# Patient Record
Sex: FEMALE | Race: OTHER | Employment: OTHER | URBAN - METROPOLITAN AREA
[De-identification: names, ages, dates, MRNs, and addresses within clinical notes are randomized per-mention and may not be internally consistent; named-entity substitution may affect disease eponyms.]

---

## 2018-05-02 ENCOUNTER — TELEPHONE (OUTPATIENT)
Dept: VASCULAR SURGERY | Age: 68
End: 2018-05-02

## 2018-05-02 DIAGNOSIS — M79.604 PAIN IN BOTH LOWER EXTREMITIES: Primary | ICD-10-CM

## 2018-05-02 DIAGNOSIS — M79.605 PAIN IN BOTH LOWER EXTREMITIES: Primary | ICD-10-CM

## 2018-05-02 NOTE — TELEPHONE ENCOUNTER
Patient called she has been having pain and swelling in both leg but mostly right. She wanted to have study done. Told her I would send message to see if she needs appointment 1st, someone would call her back.

## 2018-07-11 ENCOUNTER — OFFICE VISIT (OUTPATIENT)
Dept: VASCULAR SURGERY | Age: 68
End: 2018-07-11

## 2018-07-11 VITALS
RESPIRATION RATE: 17 BRPM | DIASTOLIC BLOOD PRESSURE: 62 MMHG | HEIGHT: 62 IN | BODY MASS INDEX: 36.8 KG/M2 | WEIGHT: 200 LBS | SYSTOLIC BLOOD PRESSURE: 140 MMHG | HEART RATE: 80 BPM

## 2018-07-11 DIAGNOSIS — I70.213 ATHEROSCLEROSIS OF NATIVE ARTERY OF BOTH LOWER EXTREMITIES WITH INTERMITTENT CLAUDICATION (HCC): Primary | ICD-10-CM

## 2018-07-11 DIAGNOSIS — I70.203 BILATERAL FEMORAL ARTERY STENOSIS (HCC): ICD-10-CM

## 2018-07-11 RX ORDER — AMLODIPINE BESYLATE 10 MG/1
TABLET ORAL DAILY
COMMUNITY

## 2018-07-11 RX ORDER — GABAPENTIN 300 MG/1
300 CAPSULE ORAL 3 TIMES DAILY
COMMUNITY

## 2018-07-11 NOTE — MR AVS SNAPSHOT
[address] [facility] [address] 
[phone] Patient: Emily Ruelas MRN: [id number] VEB:[id number] Visit Information Date & Time Provider Department Dept. Phone Encounter #  
 7/11/2018  2:00 PM Jorge Luis Lemos, [address] [facility] [id number] Your Appointments 7/30/2018  2:45 PM  
PROCEDURE with [facility] NONIMAGING [facility] ([address] [place] [address]) Appt Note: ADAIR Melissa Brill [address] [facility] [address] 697 200 [facility]  
[phone] 26[address] [facility],[address]  
  
    
 7/30/2018  3:45 PM  
PROCEDURE with [facility] IMAGING 2 [facility] ([address] [place] [address]) Appt Note: Haile LE with Simona Hernandez [address] [facility] [address] 998 200 [facility]  
[phone] Fela Damon Narciso 172 47 [facility]  
  
    
 8/16/2018  1:45 PM  
Follow Up with JOSE [facility] ([address] [place] [address]) Appt Note: Fup after studies [address] [facility] [address] 580 200 [facility]  
[phone] 2300 [facility] 200 [facility] Upcoming Health Maintenance Date Due Hepatitis C Screening 1950 DTaP/Tdap/Td series (1 - Tdap) 5/24/1970 BREAST CANCER SCRN MAMMOGRAM 5/24/1999 FOBT Q 1 YEAR AGE 50-75 5/24/1999 ZOSTER VACCINE AGE 60> 3/24/2009 GLAUCOMA SCREENING Q2Y 5/24/2014 Bone Densitometry (Dexa) Screening 5/24/2014 Pneumococcal 65+ Low/Medium Risk (1 of 2 - PCV13) 5/24/2014 MEDICARE YEARLY EXAM 3/14/2018 Influenza Age 5 to Adult 8/1/2018 Allergies as of 7/11/2018  Review Complete On: 5/12/2016 By: Maria Guadalupe Jefferson RN Severity Noted Reaction Type Reactions Sulfa (Sulfonamide Antibiotics) Medium 05/12/2016   Systemic Shortness of Breath, Rash Diflucan [Fluconazole]    Unknown (comments) Flagyl [Metronidazole]    Unknown (comments) Iodine    Unknown (comments) Reglan [Metoclopramide]    Unknown (comments) Shellfish Derived    Unknown (comments) Current Immunizations  Never Reviewed No immunizations on file. Not reviewed this visit You Were Diagnosed With   
  
 Codes Comments Atherosclerosis of native artery of both lower extremities with intermittent claudication ([facility]    -  Primary ICD-10-CM: N48.039 ICD-9-CM: 440.21 Vitals BP Pulse Resp Height(growth percentile) Weight(growth percentile) BMI  
 140/62 (BP 1 Location: Left arm, BP Patient Position: Sitting) 80 17 5' 2\" (1.575 m) 200 lb (90.7 kg) 36.58 kg/m2 OB Status Smoking Status Hysterectomy Never Smoker Vitals History BMI and BSA Data Body Mass Index Body Surface Area  
 36.58 kg/m 2 1.99 m 2 Your Updated Medication List  
  
   
This list is accurate as of 7/11/18  2:26 PM.  Always use your most recent med list.  
  
  
  
  
 coenzyme q10-vitamin e 100-100 mg-unit Cap Take 200 mg by mouth daily. cyanocobalamin 1,000 mcg tablet Take 1,000 mcg by mouth daily. Dexlansoprazole 60 mg Cpdb Take 60 mg by mouth daily. escitalopram oxalate 10 mg tablet Commonly known as:  Celesta Murders Take 10 mg by mouth daily. gabapentin 300 mg capsule Commonly known as:  NEURONTIN Take 300 mg by mouth three (3) times daily. HUMALOG SC  
by SubCUTAneous route. hydroCHLOROthiazide 25 mg tablet Commonly known as:  HYDRODIURIL Take 25 mg by mouth daily. LANTUS U-100 INSULIN 100 unit/mL injection Generic drug:  insulin glargine  
by SubCUTAneous route nightly. losartan 100 mg tablet Commonly known as:  COZAAR Take 100 mg by mouth daily. metoprolol succinate 100 mg tablet Commonly known as:  TOPROL-XL Take  by mouth daily. NORVASC 10 mg tablet Generic drug:  amLODIPine Take  by mouth daily. pantoprazole 40 mg tablet Commonly known as:  PROTONIX Take 40 mg by mouth daily. PLAVIX 75 mg Tab Generic drug:  clopidogrel Take  by mouth daily. rosuvastatin 20 mg tablet Commonly known as:  CRESTOR Take 20 mg by mouth nightly. VITAMIN D2 50,000 unit capsule Generic drug:  ergocalciferol Take 50,000 Units by mouth. ZyrTEC 10 mg Cap Generic drug:  Cetirizine Take  by mouth. To-Do List   
 07/11/2018 Vascular/US:  DUPLEX LOW EXT ARTERIES WITH ADAIR Introducing [facility]! [place] [employer] introduces [employer] patient portal. Now you can access parts of your medical record, email your doctor's office, and request medication refills online. 1. In your internet browser, go to [email or web address] 2. Click on the First Time User? Click Here link in the Sign In box. You will see the New Member Sign Up page. 3. Enter your [employer] Access Code exactly as it appears below. You will not need to use this code after youve completed the sign-up process. If you do not sign up before the expiration date, you must request a new code. · [employer] Access Code: [email or web address] Expires: 7/31/2018  1:39 PM 
 
4. Enter the last four digits of your Social Security Number (xxxx) and Date of Birth (mm/dd/yyyy) as indicated and click Submit. You will be taken to the next sign-up page. 5. Create a [employer] ID. This will be your [employer] login ID and cannot be changed, so think of one that is secure and easy to remember. 6. Create a [employer] password. You can change your password at any time. 7. Enter your Password Reset Question and Answer. This can be used at a later time if you forget your password. 8. Enter your e-mail address. You will receive e-mail notification when new information is available in [address] 9. Click Sign Up.  You can now view and download portions of your medical record. 10. Click the Download Summary menu link to download a portable copy of your medical information. If you have questions, please visit the Frequently Asked Questions section of the [employer] website. Remember, [employer] is NOT to be used for urgent needs. For medical emergencies, dial 911. Now available from your iPhone and Android! Please provide this summary of care documentation to your next provider. Your primary care clinician is listed as Cornelious Felty. If you have any questions after today's visit, please call [phone].

## 2018-07-11 NOTE — PROGRESS NOTES
Talia Jones    Chief Complaint   Patient presents with    Leg Pain       History and Physical    Ms Maddy Elizondo is here with complaints of leg pain, consistent with claudication. It is been about 2 years since she was last in. At that time it was for the same symptoms, primarily involving the left leg. But now she says symptoms are bilateral.  What we had seen previously was that she did have severe PAD of the left leg, with an ADAIR of about 0.5. Right leg only had mild to moderate disease, with ADAIR of about 0.8. We discussed at that time about proceeding with angiogram, but she was also pending thyroid surgery, and wanted to delay any scheduling. She had stated that she would call us back. She did not do so for 2 years, and in fact she never had a thyroid surgery either. She seems very hesitant about committing to treatment plans. But she is at a point where her symptoms are quite debilitating. She uses the example of her hallway, for example, and states that she had to stop several times walking in the [place] because of the pain. She has not progressed to the point of rest pain though. She does remain on good medical therapy, including Plavix. Past Medical History:   Diagnosis Date    Allergic rhinitis     Anxiety     Constipation     Depression     Diabetes (HCC)     GERD (gastroesophageal reflux disease)     High cholesterol     Hyperlipidemia     Hypertension     Osteopenia     Other cataract     Pain in limb     Pelvic pain     Plantar fasciitis     Proteinuria     Thyroid nodule     Vitamin B12 deficiency     Vitamin D deficiency      There is no problem list on file for this patient. Past Surgical History:   Procedure Laterality Date    HX  SECTION      HX CHOLECYSTECTOMY      HX HYSTERECTOMY       Current Outpatient Prescriptions   Medication Sig Dispense Refill    Cetirizine (ZYRTEC) 10 mg cap Take  by mouth.       amLODIPine (NORVASC) 10 mg tablet Take  by mouth daily.  gabapentin (NEURONTIN) 300 mg capsule Take 300 mg by mouth three (3) times daily.  Dexlansoprazole 60 mg CpDB Take 60 mg by mouth daily.  cyanocobalamin 1,000 mcg tablet Take 1,000 mcg by mouth daily.  coenzyme q10-vitamin e 100-100 mg-unit cap Take 200 mg by mouth daily.  rosuvastatin (CRESTOR) 20 mg tablet Take 20 mg by mouth nightly.  metoprolol succinate (TOPROL-XL) 100 mg tablet Take  by mouth daily.  escitalopram oxalate (LEXAPRO) 10 mg tablet Take 10 mg by mouth daily.  clopidogrel (PLAVIX) 75 mg tablet Take  by mouth daily.  losartan (COZAAR) 100 mg tablet Take 100 mg by mouth daily.  insulin glargine (LANTUS) 100 unit/mL injection by SubCUTAneous route nightly.  INSULIN LISPRO (HUMALOG SC) by SubCUTAneous route.  ergocalciferol (VITAMIN D2) 50,000 unit capsule Take 50,000 Units by mouth.  hydrochlorothiazide (HYDRODIURIL) 25 mg tablet Take 25 mg by mouth daily.  pantoprazole (PROTONIX) 40 mg tablet Take 40 mg by mouth daily.        Allergies   Allergen Reactions    Sulfa (Sulfonamide Antibiotics) Shortness of Breath and Rash    Diflucan [Fluconazole] Unknown (comments)    Flagyl [Metronidazole] Unknown (comments)    Iodine Unknown (comments)    Reglan [Metoclopramide] Unknown (comments)    Shellfish Derived Unknown (comments)       Review of Systems    Review of Systems - History obtained from the patient  General ROS: negative  Psychological ROS: positive for - anxiety  Ophthalmic ROS: negative  Respiratory ROS: negative  Cardiovascular ROS: negative  Gastrointestinal ROS: negative  Musculoskeletal ROS: negative  Neurological ROS: negative  Dermatological ROS: negative  Vascular ROS: claudication     Physical   Visit Vitals    /62 (BP 1 Location: Left arm, BP Patient Position: Sitting)    Pulse 80    Resp 17    Ht 5' 2\" (1.575 m)    Wt 200 lb (90.7 kg)    BMI 36.58 kg/m2     General:  Alert, cooperative, no distress. Head:  Normocephalic, without obvious abnormality, atraumatic. Eyes:    Conjunctivae/corneas clear. Pupils equal, round, reactive to light. Extraocular movements intact. Extremities: Extremities normal, atraumatic, no cyanosis or edema. Pulses: Distal pulses nonpalpable bilaterally but no ischemic changes   Skin: Skin color, texture, turgor normal. No rashes or lesions. Impression/Plan:     ICD-10-CM ICD-9-CM    1. Atherosclerosis of native artery of both lower extremities with intermittent claudication (HCC) I70.213 440.21 Cetirizine (ZYRTEC) 10 mg cap      amLODIPine (NORVASC) 10 mg tablet      gabapentin (NEURONTIN) 300 mg capsule      DUPLEX LOW EXT ARTERIES WITH ADAIR   2. Bilateral femoral artery stenosis (HCC) I70.203 440.20      Orders Placed This Encounter    Cetirizine (ZYRTEC) 10 mg cap    amLODIPine (NORVASC) 10 mg tablet    gabapentin (NEURONTIN) 300 mg capsule     Based on her prior studies, I discussed going ahead and planing the angiogram which had been her last discussion, but to still get new studies since it has been 2 years just to see any progression of disease, but she did not again want to commit to the angiogram.  She would first like to have the studies and then follow-up for further discussion and planning. This will be arranged according to her request    JOSE Ball    Portions of this note have been entered using voice recognition software.

## 2018-07-11 NOTE — PROGRESS NOTES
1. Have you been to an emergency room or urgent care clinic since your last visit? no    Hospitalized since your last visit? If yes, where, when, and reason for visit? no  2. Have you seen or consulted any other health care providers outside of the [facility] since your last visit including any procedures, health maintenance items.  If yes, where, when and reason for visit? no

## 2019-04-10 ENCOUNTER — OFFICE VISIT (OUTPATIENT)
Dept: VASCULAR SURGERY | Age: 69
End: 2019-04-10

## 2019-04-10 VITALS
HEIGHT: 62 IN | WEIGHT: 200 LBS | SYSTOLIC BLOOD PRESSURE: 160 MMHG | RESPIRATION RATE: 16 BRPM | BODY MASS INDEX: 36.8 KG/M2 | DIASTOLIC BLOOD PRESSURE: 58 MMHG | HEART RATE: 74 BPM

## 2019-04-10 DIAGNOSIS — K55.1 SUPERIOR MESENTERIC ARTERY STENOSIS (HCC): Primary | ICD-10-CM

## 2019-04-10 DIAGNOSIS — R10.13 POSTPRANDIAL EPIGASTRIC PAIN: ICD-10-CM

## 2019-04-10 NOTE — PROGRESS NOTES
1. Have you been to an emergency room or urgent care clinic since your last visit? NO  Hospitalized since your last visit? If yes, where, when, and reason for visit? No  2. Have you seen or consulted any other health care providers outside of the [facility] since your last visit including any procedures, health maintenance items. If yes, where, when and reason for visit?

## 2019-04-11 NOTE — PROGRESS NOTES
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 This greater than 70% stenosis of the SMA was found on duplex from an outlying facility for workup of her abdominal pain. Chart review describes that she does have some postprandial pain. It is related to the epigastric area. Upon further questioning, patient states that it is not always postprandial.  But she does have a general pain, fullness, pulling, tightening symptomatology, but no direct food aversion, no weight loss. She has had previous abdominal surgeries and she even questions if adhesions may be contributing. I stated that is certainly possible, and that in fact she may have multiple issues that are contributing to her abdominal pain, and so trying to associate mesenteric stenosis separately from that is somewhat difficult with the description of her symptoms. She had also come in last summer with complaints of leg pain, consistent with claudication. At that time it had been about 2 years since she was last in. It had been for the same symptoms, primarily involving the left leg. She now reports symptoms are bilateral.  What we had seen previously was that she did have severe PAD of the left leg, with an ADAIR of about 0.5. Right leg only had mild to moderate disease, with ADAIR of about 0.8. We discussed at that time about proceeding with angiogram, but she was also pending thyroid surgery, and wanted to delay any scheduling. She had stated that she would call us back. She did not do so for 2 years, and in fact she never had a thyroid surgery either. She seems very hesitant about committing to treatment plans. When she was here in July though, we discussed and we were going to go ahead and proceed with scheduling an angiogram.  Again, we see that that did not take place. She states that because she was hospitalized with CHF.   She is also now on full-time oxygen therapy, but is unclear as to the reason why. She has a pulmonologist has cleared her from any pulmonary issues. She wonders if it is vascular related, which I said that there would generally not be any association    Even though this referral was specific to the GI request, she is more focused and concerned about her leg pain. She is at a point where her symptoms are quite debilitating. She uses the example of her hallway, for example, and states that she had to stop several times walking [place] because of the pain. She has not progressed to the point of rest pain though. She does remain on good medical therapy, including Plavix. She did have some repeated arterial studies prior to today's visit, I am not sure when those were ordered but they were completed and summarized as follows:  Right Lower Arterial     Patent distal right external iliac artery, common femoral artery, proximal femoral artery, femoral artery and popliteal artery without significant stenosis. Triphasic to biphasic signals noted. Diffuse calcific plaquing noted in the mild (<50%) stenosis. Right tibial arteries patent without significant stenosis. Poorly biphasic signals noted throughout. Left Lower Arterial     Patent distal left external iliac artery, common femoral artery and proximal profunda femoris artery without significant stenosis. Biphasic to poorly biphasic signals noted. Diffuse calcific plaquing noted. Left femoral artery patent with elevated velocities noted in the distal thigh level suggesting moderate to severe (50-75%) stenosis due to narrowing. Diffuse calcific plaquing noted. Poorly biphasic signals noted. Left tibial artery patent without significant stenosis in the segments accessible to scan. Monophasic signals noted. Diffuse calcific plaquing noted throughout. ADAIR     Bilateral ABIs are unreliable due to medial calcinosis. The right anterior tibial artery and posterior tibial artery has monophasic waveforms.  Right toe PPG is dampened. The left anterior tibial artery and posterior tibial artery has monophasic waveforms. Left toe PPG is dampened. Moderate arterial insufficiency bilaterally at rest by waveform analysis, left greater than right. Arterial Pressure Measurements      Right Left   Brachial  mmHg       141 mmHg         Post Tibial  mmHg       94 mmHg         Tibial/DP  mmHg       82 mmHg         ADAIR 1.01        0.61          Toe Pressure 62 mmHg       47 mmHg         TBI 0.4        0.3                    Prior Study     The exam was compared to the study performed on 5/27/2016. Essentially no significant changes. I reviewed that there is really no change his study results for about 3 years, but she states that her symptoms are just getting progressively worse. Her pain is very generalized throughout the whole legs, and not specific to calf claudication. So I again reviewed that her symptoms may be multifactorial, such as some musculoskeletal component to. And, symptoms are bilateral, with right leg being with normal perfusion. But she does confess the left leg is the worst.  I told her I would not set expectations too high that being able to do any treatment will completely relieve her symptoms if in fact there are other conditions causing her pain. But she seems very focused on the issue with the legs, and would actually like to prioritize having the legs treated before any considerable treatment for the SMA disease, because she would also like to seek out general surgery opinion regarding the adhesions as a contributor. I am going to do say that it was difficult to maintain a focused discussion about the relevant vascular issues today. She is trying to associate other causes of her pain, which may be legitimate, but I explained the vascular emphasis. Again, I cannot guarantee that any intervention will offer absolutely beneficial outcomes.   We certainly have findings that support disease that could be amenable to treatment and that she may be able to gain some improvement in her symptoms. With her leg being the actual focus, we can start with that. Typically our diagnostic imaging at the time of any potential endovascular intervention would include aortogram with runoff. It is possible that we may be able to include diagnostic of the SMA to confirm the ultrasound findings. This could be done at the time of treatment for her left leg, and if we see a treatable SMA lesion we could certainly staged procedure we could come back and do that. She states that she has some other things on her schedule and would not want to schedule anything until after April 18. So we will call her back for opportunity to choose a date that is best suited for her. She can remain on her Plavix and aspirin therapy. She will need some repeat labs including kidney function.   It appears she will also have to be premedicated due to contrast allergy so we will coordinate that through her pharmacy    At least 25-30 minutes was spent in discussion with this patient today

## 2019-04-25 ENCOUNTER — DOCUMENTATION ONLY (OUTPATIENT)
Dept: VASCULAR SURGERY | Age: 69
End: 2019-04-25

## 2019-04-25 NOTE — PROGRESS NOTES
Patient returned call and states she has other health problems going on at this time.  She would liked to wait until they have been taken care of before she has this surgery

## 2019-08-06 RX ORDER — RANITIDINE 150 MG/1
TABLET, FILM COATED ORAL
Qty: 2 TAB | Refills: 0 | Status: SHIPPED | OUTPATIENT
Start: 2019-08-06

## 2019-08-06 RX ORDER — PREDNISONE 50 MG/1
TABLET ORAL
Qty: 3 TAB | Refills: 0 | Status: SHIPPED | OUTPATIENT
Start: 2019-08-06

## 2019-08-06 RX ORDER — DIPHENHYDRAMINE HCL 25 MG
CAPSULE ORAL
Qty: 4 CAP | Refills: 0 | Status: SHIPPED | OUTPATIENT
Start: 2019-08-06

## 2019-09-05 ENCOUNTER — DOCUMENTATION ONLY (OUTPATIENT)
Dept: VASCULAR SURGERY | Age: 69
End: 2019-09-05

## 2019-09-05 NOTE — PROGRESS NOTES
PCP states that she is not cleared for sugars are to high at this time. Surgery canceled at this time.

## 2020-03-10 ENCOUNTER — TELEPHONE (OUTPATIENT)
Dept: VASCULAR SURGERY | Age: 70
End: 2020-03-10

## 2020-03-10 NOTE — TELEPHONE ENCOUNTER
Called and left detailed message to call back regarding procedure, richard she wanted to see MD first or just schedule leg angio vs mesenteric angio.

## 2020-03-10 NOTE — TELEPHONE ENCOUNTER
----- Message from JOSE Hernandez sent at 3/10/2020  8:48 AM EDT -----  What procedure does she want? Leg angio vs mesenteric? Whatever she decides just schedule. If she prefers to come in though do it with victoriano.   ----- Message -----  From: Stephen Vora RN  Sent: 3/10/2020   7:31 AM EDT  To: JOSE Hernandez    Patient called and stated she is ready for procedure? Do you want to see or just schedule?

## 2020-03-10 NOTE — TELEPHONE ENCOUNTER
Patient called back and she wants to just go ahead and schedule procedure (mesentaric angio)so she can get it done. Pt already has the dye prep from when she was scheduled prior. Gave to liz to schedule.

## 2020-03-10 NOTE — TELEPHONE ENCOUNTER
------ Message -----  From: Mercy Vivas RN  Sent: 3/10/2020   7:31 AM EDT  To: JOSE Ball    Patient called and stated she is ready for procedure? Do you want to see or just schedule?

## 2020-03-18 DIAGNOSIS — I70.203 BILATERAL FEMORAL ARTERY STENOSIS (HCC): Primary | ICD-10-CM

## 2020-03-18 RX ORDER — PANTOPRAZOLE SODIUM 40 MG/1
TABLET, DELAYED RELEASE ORAL
Qty: 2 TAB | Refills: 0 | Status: SHIPPED | OUTPATIENT
Start: 2020-03-18

## 2020-03-18 NOTE — TELEPHONE ENCOUNTER
Order for protonix 40 mg Take one tab by mouth 7hrs and one hr prior to procedure #3/0 placed per verbal order from Dr. Adam Perdomo.

## 2020-06-02 NOTE — H&P
Surgery History and Physical    Subjective:      Sean Ashton is a 69 y.o.  female who presents with known mesenteric artery stenosis found on prior duplex imaging. She had delayed further reevaluation/treatment due to other issues and she called recently to try to get the mesenteric angio/intervention arranged that we had discussed with her previously    There are no active problems to display for this patient. Past Medical History:   Diagnosis Date    Allergic rhinitis     Anxiety     Constipation     Depression     Diabetes (HCC)     GERD (gastroesophageal reflux disease)     High cholesterol     Hyperlipidemia     Hypertension     Osteopenia     Other cataract     Pain in limb     Pelvic pain     Plantar fasciitis     Proteinuria     Thyroid nodule     Vitamin B12 deficiency     Vitamin D deficiency       Past Surgical History:   Procedure Laterality Date    HX  SECTION      HX CHOLECYSTECTOMY      HX HYSTERECTOMY        Social History     Tobacco Use    Smoking status: Never Smoker   Substance Use Topics    Alcohol use: No      Family History   Problem Relation Age of Onset    Diabetes Other     Heart Disease Other     Hypertension Mother     Heart Disease Father     Diabetes Sister     Stroke Brother       Prior to Admission medications    Medication Sig Start Date End Date Taking? Authorizing Provider   pantoprazole (PROTONIX) 40 mg tablet Take one tab by mouth 7hrs and one hr prior to procedure. 3/18/20   Sarah Rebollar MD   predniSONE (DELTASONE) 50 mg tablet One tab by mouth 13hrs, 7hrs and One hr prior to procedure. 19   JOSE Mcallister   diphenhydrAMINE (BENADRYL) 25 mg capsule Take two tab by mouth 7hrs and one hr prior to procedure. 19   JOSE Mcallister   raNITIdine (ZANTAC) 150 mg tablet Take one tab by mouth 7hrs and one hr prior to procedure. 19   JOSE Mcallister   Cetirizine (ZYRTEC) 10 mg cap Take  by mouth. Provider, Historical   amLODIPine (NORVASC) 10 mg tablet Take  by mouth daily. Provider, Historical   gabapentin (NEURONTIN) 300 mg capsule Take 300 mg by mouth three (3) times daily. Provider, Historical   Dexlansoprazole 60 mg CpDB Take 60 mg by mouth daily. Provider, Historical   cyanocobalamin 1,000 mcg tablet Take 1,000 mcg by mouth daily. Provider, Historical   coenzyme q10-vitamin e 100-100 mg-unit cap Take 200 mg by mouth daily. Provider, Historical   hydrochlorothiazide (HYDRODIURIL) 25 mg tablet Take 25 mg by mouth daily. Provider, Historical   rosuvastatin (CRESTOR) 20 mg tablet Take 20 mg by mouth nightly. Provider, Historical   metoprolol succinate (TOPROL-XL) 100 mg tablet Take  by mouth daily. Provider, Historical   escitalopram oxalate (LEXAPRO) 10 mg tablet Take 10 mg by mouth daily. Provider, Historical   clopidogrel (PLAVIX) 75 mg tablet Take  by mouth daily. Provider, Historical   losartan (COZAAR) 100 mg tablet Take 100 mg by mouth daily. Provider, Historical   insulin glargine (LANTUS) 100 unit/mL injection by SubCUTAneous route nightly. Provider, Historical   INSULIN LISPRO (HUMALOG SC) by SubCUTAneous route. Provider, Historical   ergocalciferol (VITAMIN D2) 50,000 unit capsule Take 50,000 Units by mouth. Provider, Historical   pantoprazole (PROTONIX) 40 mg tablet Take 40 mg by mouth daily. Provider, Historical     Allergies   Allergen Reactions    Sulfa (Sulfonamide Antibiotics) Shortness of Breath and Rash    Diflucan [Fluconazole] Unknown (comments)    Flagyl [Metronidazole] Unknown (comments)    Iodine Unknown (comments)    Reglan [Metoclopramide] Unknown (comments)    Shellfish Derived Unknown (comments)         Review of Systems:    Pertinent items are noted in the History of Present Illness. Objective:     No data found. No data recorded.       Physical Exam:  GENERAL: alert, cooperative, no distress, appears stated age, LUNG: clear to auscultation bilaterally, HEART: regular rate and rhythm, S1, S2 normal, no murmur, click, rub or gallop, EXTREMITIES:  extremities normal, atraumatic, no cyanosis or edema      Assessment:     Mesenteric stenosis     Plan:     Mesenteric angiogram/intervention    Signed By: JOSE Alejo     June 2, 2020

## 2020-06-03 ENCOUNTER — HOSPITAL ENCOUNTER (OUTPATIENT)
Age: 70
Discharge: HOME | End: 2020-06-03
Attending: SURGERY | Admitting: SURGERY
Payer: MEDICARE

## 2020-06-03 ENCOUNTER — ANESTHESIA EVENT (OUTPATIENT)
Dept: CARDIAC CATH/INVASIVE PROCEDURES | Age: 70
End: 2020-06-03
Payer: MEDICARE

## 2020-06-03 ENCOUNTER — ANESTHESIA (OUTPATIENT)
Dept: CARDIAC CATH/INVASIVE PROCEDURES | Age: 70
End: 2020-06-03
Payer: MEDICARE

## 2020-06-03 VITALS
RESPIRATION RATE: 18 BRPM | TEMPERATURE: 97.7 F | HEART RATE: 74 BPM | HEIGHT: 62 IN | BODY MASS INDEX: 35.51 KG/M2 | WEIGHT: 193 LBS | OXYGEN SATURATION: 100 % | SYSTOLIC BLOOD PRESSURE: 178 MMHG | DIASTOLIC BLOOD PRESSURE: 72 MMHG

## 2020-06-03 DIAGNOSIS — K55.1 SUPERIOR MESENTERIC ARTERY STENOSIS (HCC): ICD-10-CM

## 2020-06-03 LAB
BUN BLD-MCNC: 42 MG/DL (ref 7–18)
CHLORIDE BLD-SCNC: 103 MMOL/L (ref 100–108)
CREAT UR-MCNC: 1.6 MG/DL (ref 0.6–1.3)
GLUCOSE BLD STRIP.AUTO-MCNC: 360 MG/DL (ref 74–106)
HCT VFR BLD CALC: 33 % (ref 36–49)
HGB BLD-MCNC: 11.2 G/DL (ref 12–16)
POTASSIUM BLD-SCNC: 5.1 MMOL/L (ref 3.5–5.5)
SODIUM BLD-SCNC: 137 MMOL/L (ref 136–145)

## 2020-06-03 PROCEDURE — 74011000250 HC RX REV CODE- 250: Performed by: NURSE ANESTHETIST, CERTIFIED REGISTERED

## 2020-06-03 PROCEDURE — 77030008584 HC TOOL GDWRE DEV TERU -A: Performed by: SURGERY

## 2020-06-03 PROCEDURE — 76060000032 HC ANESTHESIA 0.5 TO 1 HR: Performed by: SURGERY

## 2020-06-03 PROCEDURE — C1769 GUIDE WIRE: HCPCS | Performed by: SURGERY

## 2020-06-03 PROCEDURE — C1894 INTRO/SHEATH, NON-LASER: HCPCS | Performed by: SURGERY

## 2020-06-03 PROCEDURE — 37246 TRLUML BALO ANGIOP 1ST ART: CPT | Performed by: SURGERY

## 2020-06-03 PROCEDURE — 74011250636 HC RX REV CODE- 250/636

## 2020-06-03 PROCEDURE — 36245 INS CATH ABD/L-EXT ART 1ST: CPT

## 2020-06-03 PROCEDURE — 74011250636 HC RX REV CODE- 250/636: Performed by: SURGERY

## 2020-06-03 PROCEDURE — 75726 ARTERY X-RAYS ABDOMEN: CPT | Performed by: SURGERY

## 2020-06-03 PROCEDURE — 77030013744: Performed by: SURGERY

## 2020-06-03 PROCEDURE — 74011636320 HC RX REV CODE- 636/320: Performed by: SURGERY

## 2020-06-03 PROCEDURE — 82565 ASSAY OF CREATININE: CPT

## 2020-06-03 PROCEDURE — 77030004530 HC CATH ANGI DX IMGR BSC -A: Performed by: SURGERY

## 2020-06-03 PROCEDURE — 76937 US GUIDE VASCULAR ACCESS: CPT | Performed by: SURGERY

## 2020-06-03 PROCEDURE — 74011250636 HC RX REV CODE- 250/636: Performed by: NURSE ANESTHETIST, CERTIFIED REGISTERED

## 2020-06-03 PROCEDURE — 82947 ASSAY GLUCOSE BLOOD QUANT: CPT

## 2020-06-03 PROCEDURE — 74011000250 HC RX REV CODE- 250: Performed by: SURGERY

## 2020-06-03 RX ORDER — DEXTROSE 50 % IN WATER (D50W) INTRAVENOUS SYRINGE
25-50 AS NEEDED
Status: DISCONTINUED | OUTPATIENT
Start: 2020-06-03 | End: 2020-06-04 | Stop reason: HOSPADM

## 2020-06-03 RX ORDER — IODIXANOL 320 MG/ML
INJECTION, SOLUTION INTRAVASCULAR AS NEEDED
Status: DISCONTINUED | OUTPATIENT
Start: 2020-06-03 | End: 2020-06-03 | Stop reason: HOSPADM

## 2020-06-03 RX ORDER — NALOXONE HYDROCHLORIDE 0.4 MG/ML
0.1 INJECTION, SOLUTION INTRAMUSCULAR; INTRAVENOUS; SUBCUTANEOUS AS NEEDED
Status: DISCONTINUED | OUTPATIENT
Start: 2020-06-03 | End: 2020-06-04 | Stop reason: HOSPADM

## 2020-06-03 RX ORDER — ONDANSETRON 2 MG/ML
4 INJECTION INTRAMUSCULAR; INTRAVENOUS ONCE
Status: DISCONTINUED | OUTPATIENT
Start: 2020-06-03 | End: 2020-06-03 | Stop reason: HOSPADM

## 2020-06-03 RX ORDER — SODIUM CHLORIDE, SODIUM LACTATE, POTASSIUM CHLORIDE, CALCIUM CHLORIDE 600; 310; 30; 20 MG/100ML; MG/100ML; MG/100ML; MG/100ML
50 INJECTION, SOLUTION INTRAVENOUS CONTINUOUS
Status: DISCONTINUED | OUTPATIENT
Start: 2020-06-03 | End: 2020-06-04 | Stop reason: HOSPADM

## 2020-06-03 RX ORDER — SODIUM CHLORIDE 0.9 % (FLUSH) 0.9 %
5-40 SYRINGE (ML) INJECTION AS NEEDED
Status: DISCONTINUED | OUTPATIENT
Start: 2020-06-03 | End: 2020-06-04 | Stop reason: HOSPADM

## 2020-06-03 RX ORDER — INSULIN LISPRO 100 [IU]/ML
INJECTION, SOLUTION INTRAVENOUS; SUBCUTANEOUS ONCE
Status: DISCONTINUED | OUTPATIENT
Start: 2020-06-03 | End: 2020-06-03 | Stop reason: HOSPADM

## 2020-06-03 RX ORDER — SODIUM CHLORIDE 9 MG/ML
INJECTION, SOLUTION INTRAVENOUS
Status: DISCONTINUED | OUTPATIENT
Start: 2020-06-03 | End: 2020-06-03 | Stop reason: HOSPADM

## 2020-06-03 RX ORDER — LIDOCAINE HYDROCHLORIDE 20 MG/ML
INJECTION, SOLUTION EPIDURAL; INFILTRATION; INTRACAUDAL; PERINEURAL AS NEEDED
Status: DISCONTINUED | OUTPATIENT
Start: 2020-06-03 | End: 2020-06-03 | Stop reason: HOSPADM

## 2020-06-03 RX ORDER — HYDRALAZINE HYDROCHLORIDE 20 MG/ML
INJECTION INTRAMUSCULAR; INTRAVENOUS
Status: COMPLETED
Start: 2020-06-03 | End: 2020-06-03

## 2020-06-03 RX ORDER — LIDOCAINE HYDROCHLORIDE 10 MG/ML
INJECTION, SOLUTION EPIDURAL; INFILTRATION; INTRACAUDAL; PERINEURAL AS NEEDED
Status: DISCONTINUED | OUTPATIENT
Start: 2020-06-03 | End: 2020-06-03 | Stop reason: HOSPADM

## 2020-06-03 RX ORDER — DIPHENHYDRAMINE HYDROCHLORIDE 50 MG/ML
12.5 INJECTION, SOLUTION INTRAMUSCULAR; INTRAVENOUS
Status: DISCONTINUED | OUTPATIENT
Start: 2020-06-03 | End: 2020-06-04 | Stop reason: HOSPADM

## 2020-06-03 RX ORDER — PROPOFOL 10 MG/ML
VIAL (ML) INTRAVENOUS
Status: DISCONTINUED | OUTPATIENT
Start: 2020-06-03 | End: 2020-06-03 | Stop reason: HOSPADM

## 2020-06-03 RX ORDER — HYDRALAZINE HYDROCHLORIDE 20 MG/ML
10 INJECTION INTRAMUSCULAR; INTRAVENOUS ONCE
Status: COMPLETED | OUTPATIENT
Start: 2020-06-03 | End: 2020-06-03

## 2020-06-03 RX ORDER — SODIUM CHLORIDE 0.9 % (FLUSH) 0.9 %
5-40 SYRINGE (ML) INJECTION EVERY 8 HOURS
Status: DISCONTINUED | OUTPATIENT
Start: 2020-06-03 | End: 2020-06-04 | Stop reason: HOSPADM

## 2020-06-03 RX ORDER — MAGNESIUM SULFATE 100 %
4 CRYSTALS MISCELLANEOUS AS NEEDED
Status: DISCONTINUED | OUTPATIENT
Start: 2020-06-03 | End: 2020-06-04 | Stop reason: HOSPADM

## 2020-06-03 RX ORDER — FENTANYL CITRATE 50 UG/ML
50 INJECTION, SOLUTION INTRAMUSCULAR; INTRAVENOUS AS NEEDED
Status: DISCONTINUED | OUTPATIENT
Start: 2020-06-03 | End: 2020-06-04 | Stop reason: HOSPADM

## 2020-06-03 RX ORDER — LABETALOL HCL 20 MG/4 ML
SYRINGE (ML) INTRAVENOUS AS NEEDED
Status: DISCONTINUED | OUTPATIENT
Start: 2020-06-03 | End: 2020-06-03 | Stop reason: HOSPADM

## 2020-06-03 RX ADMIN — HYDRALAZINE HYDROCHLORIDE 10 MG: 20 INJECTION, SOLUTION INTRAMUSCULAR; INTRAVENOUS at 14:04

## 2020-06-03 RX ADMIN — LABETALOL 20 MG/4 ML (5 MG/ML) INTRAVENOUS SYRINGE 5 MG: at 10:06

## 2020-06-03 RX ADMIN — SODIUM CHLORIDE: 900 INJECTION, SOLUTION INTRAVENOUS at 09:49

## 2020-06-03 RX ADMIN — HYDRALAZINE HYDROCHLORIDE 10 MG: 20 INJECTION INTRAMUSCULAR; INTRAVENOUS at 09:27

## 2020-06-03 RX ADMIN — HYDRALAZINE HYDROCHLORIDE 10 MG: 20 INJECTION INTRAMUSCULAR; INTRAVENOUS at 14:04

## 2020-06-03 RX ADMIN — PROPOFOL 50 MCG/KG/MIN: 10 INJECTION, EMULSION INTRAVENOUS at 10:06

## 2020-06-03 RX ADMIN — LABETALOL 20 MG/4 ML (5 MG/ML) INTRAVENOUS SYRINGE 10 MG: at 10:23

## 2020-06-03 RX ADMIN — LIDOCAINE HYDROCHLORIDE 50 MG: 20 INJECTION, SOLUTION EPIDURAL; INFILTRATION; INTRACAUDAL; PERINEURAL at 10:06

## 2020-06-03 NOTE — ANESTHESIA POSTPROCEDURE EVALUATION
Procedure(s):  ANGIOGRAPHY VISCERAL. MAC    Anesthesia Post Evaluation      Multimodal analgesia: multimodal analgesia used between 6 hours prior to anesthesia start to PACU discharge  Patient location during evaluation: PACU  Patient participation: complete - patient participated  Level of consciousness: awake and alert  Pain management: adequate  Airway patency: patent  Anesthetic complications: no  Cardiovascular status: acceptable and hemodynamically stable  Respiratory status: acceptable  Hydration status: acceptable  Post anesthesia nausea and vomiting:  controlled      INITIAL Post-op Vital signs:   Vitals Value Taken Time   /57 6/3/2020 10:49 AM   Temp     Pulse 74 6/3/2020 10:58 AM   Resp     SpO2 98 % 6/3/2020 10:58 AM   Vitals shown include unvalidated device data.

## 2020-06-03 NOTE — PROGRESS NOTES
Dr Mhoan Chapman made aware of pts b/p , verbal orders given for 10 mg hydralazine IV and once pts b/p starts to decrease she can be discharged home and advised to take her oral blood pressure meds when she returnsv

## 2020-06-03 NOTE — Clinical Note
TRANSFER - OUT REPORT:     Verbal report given to: Missael iL RN. Report consisted of patient's Situation, Background, Assessment and   Recommendations(SBAR). Opportunity for questions and clarification was provided. Patient transported with a Cardiac Cath Tech / Patient Care Tech. Patient transported to: [address]

## 2020-06-03 NOTE — PROGRESS NOTES
1434 I have reviewed discharge instructions with the patient. The patient verbalized understanding. Patient armband removed and given to patient to take home. Patient was informed of the privacy risks if armband lost or stolen. Pt ambulatory to lobby with steady gait, pt in no apparent distress.

## 2020-06-03 NOTE — OP NOTES
Preoperative diagnosis: Abdominal pain    Postoperative diagnosis: Abdominal pain, no evidence of mesenteric stenosis,    Procedures performed:  #1  Ultrasound of right femoral artery with interpretation  #2  Placement of catheter to aorta, aortogram with interpretation  #3  Placement of catheter to superior mesenteric artery, superior mesentery angiogram with interpretation  #4      Cultures: None    Specimens: None    Drains: None    Estimated blood loss: Less than 50 mL    Assistants: None    Implants: Please see above    Complications: None    Anesthesia: Moderate conscious sedation. Indications for the procedure:  Angy Valle is a 69 y.o. said multiple abdominal surgeries there is a feeling there might be. I reviewed her ultrasound done at [facility] which shows indication of turbulence at the origin of the superior mesenteric artery. She is agreeable today for the angiogram to search for the source of chronic abdominal pain. She is not had weight loss or fear of eating. .  Patient was given the appropriate risk and benefits of the procedure including but not limited to bleeding, infection, damage to adjacent structures, MI, stroke, death, loss of lower extremity, need for further surgery. Patient was understanding of all the risks and underwent a procedure. Operative findings:   #1  Aorta is normal in size without aneurysm. The origin and for several centimeters the celiac artery are completely patent. The superior mesenteric artery origin is completely patent. It is catheterized with a flush catheter and is completely patent throughout. No indication of any stenosis on the mesenteric vessels. Likely turbulence on Doppler reflects the acute angle as a superior mesenteric artery comes off at a 90 degree angle. Procedure:  Patient was correctly identified in the precath area and taken to the Cath Lab in stable condition. Patient had pre-incision timeout prior to any incision.   Patient was prepped and draped in the normal sterile fashion according to CDC guidelines aseptic technique. Ultrasound was done of the right groin, did identify the common femoral artery. Start a picture for PACS. Did anchor anterior puncture to the artery and placed a wire into the aorta 4 Mauritian sheath was placed. Replaced the wire with the flush catheter and did a flush angiogram in a crosstable position. Was able to get her in sort of a beachchair position with arms up which gave me an excellent view of the crosstable aorta. Next I placed a Glidewire and the flush catheter directly into superior mesenteric artery and an angiogram.  Then I wired and removed. She was transferred recovery in stable condition having tolerated procedure well. No further vascular mention regarding the mesenteric necessary.

## 2020-06-03 NOTE — ANESTHESIA PREPROCEDURE EVALUATION
Relevant Problems   No relevant active problems       Anesthetic History   No history of anesthetic complications            Review of Systems / Medical History  Patient summary reviewed, nursing notes reviewed and pertinent labs reviewed    Pulmonary    COPD (O2 3LPM)    Sleep apnea: CPAP           Neuro/Psych   Within defined limits           Cardiovascular    Hypertension          PAD and hyperlipidemia      Comments: 11/2019 ECHO  EJECTION FRACTION 60-65%  RVSP 55 TO 60 MMHG    07/2018 stress neg   GI/Hepatic/Renal     GERD           Endo/Other    Diabetes: using insulin    Obesity     Other Findings              Physical Exam    Airway  Mallampati: II  TM Distance: 4 - 6 cm  Neck ROM: normal range of motion   Mouth opening: Normal     Cardiovascular    Rhythm: regular           Dental      Comments: Missing teeth   Pulmonary  Breath sounds clear to auscultation               Abdominal  GI exam deferred       Other Findings            Anesthetic Plan    ASA: 3  Anesthesia type: MAC            Anesthetic plan and risks discussed with: Patient

## 2020-06-03 NOTE — PROGRESS NOTES
Cath holding summary    Patient escorted to cath holding from waiting area ambulatory, alert and oriented x 4, voicing no complaints. Changed into gown and placed on monitor. NPO since MN. Lab results, med rec and H&P reviewed on chart. PIV x 1 inserted without difficulty.

## 2020-06-03 NOTE — Clinical Note
Contrast Dose Calculator:   Patient's age: 69.   Patient's sex: Female. Patient weight (kg) = 87.5. Creatinine level (mg/dL) = 1.6. Creatinine clearance (mL/min): 45. Contrast concentration (mg/mL) = 320. MACD = 256.35 mL. Max Contrast dose per Creatinine Cl calculator = 101.25 mL.

## 2020-06-03 NOTE — Clinical Note
Power injection to the AO, artery. Single view taken. PSI = 1000. Rate of rise = 0.5 sec. Injection rate = 10 mL/sec. Total injected volume = 20 mL.

## 2020-06-03 NOTE — DISCHARGE INSTRUCTIONS
DISCHARGE SUMMARY from Nurse    PATIENT INSTRUCTIONS:    After general anesthesia or intravenous sedation, for 24 hours or while taking prescription Narcotics:  · Limit your activities  · Do not drive and operate hazardous machinery  · Do not make important personal or business decisions  · Do  not drink alcoholic beverages  · If you have not urinated within 8 hours after discharge, please contact your surgeon on call. Report the following to your surgeon:  · Excessive pain, swelling, redness or odor of or around the surgical area  · Temperature over 100.5  · Nausea and vomiting lasting longer than 4 hours or if unable to take medications  · Any signs of decreased circulation or nerve impairment to extremity: change in color, persistent  numbness, tingling, coldness or increase pain  · Any questions    What to do at Home:  Recommended activity: {discharge activity:65534}, ***    If you experience any of the following symptoms ***, please follow up with ***. *  Please give a list of your current medications to your Primary Care Provider. *  Please update this list whenever your medications are discontinued, doses are      changed, or new medications (including over-the-counter products) are added. *  Please carry medication information at all times in case of emergency situations. These are general instructions for a healthy lifestyle:    No smoking/ No tobacco products/ Avoid exposure to second hand smoke  Surgeon General's Warning:  Quitting smoking now greatly reduces serious risk to your health.     Obesity, smoking, and sedentary lifestyle greatly increases your risk for illness    A healthy diet, regular physical exercise & weight monitoring are important for maintaining a healthy lifestyle    You may be retaining fluid if you have a history of heart failure or if you experience any of the following symptoms:  Weight gain of 3 pounds or more overnight or 5 pounds in a week, increased swelling in our hands or feet or shortness of breath while lying flat in bed. Please call your doctor as soon as you notice any of these symptoms; do not wait until your next office visit. The discharge information has been reviewed with the {PATIENT PARENT GUARDIAN:91847}. The {PATIENT PARENT GUARDIAN:21846} verbalized understanding. Discharge medications reviewed with the {Dishcarge meds reviewed LYWD:26297} and appropriate educational materials and side effects teaching were provided. ___________________________________________________________________________________________________________________________________/ANGIOGRAPHY DISCHARGE INSTRUCTIONS    1. Check puncture site frequently for swelling or bleeding. If there is any bleeding, lie down and apply pressure over the area with a clean towel or washcloth. Notify your doctor for any redness, swelling, drainage, or oozing from the puncture site. Notify your doctor for any fever or chills. 2. If the extremity becomes cold, numb, or painful call Dr. Rocío Sherwood  3. Activity should be limited for the next 48 hours. Climb stairs as little as possible and avoid any stooping, bending, or strenuous activity for 48 hours. No heavy lifting (anything over 10 pounds) for 3 days. 4. You may resume your usual diet. Drink more fluids than usual.  5. Have a responsible person drive you home and stay with you for at least 24 hours after your heart catheterization/angiography. 6. You may remove bandage from your Right and Groin in 24 hours. You may shower in 24 hours. No tub baths, hot tubs, or swimming for 1 week. Do not place any lotions, creams, powders, or ointments over puncture site for 1 week. You may place a clean band-aid over the puncture site each day for 5 days. Change daily. I have read the above instructions and have had the opportunity to ask questions.       Patient: ________________________   Date: 6/3/2020    Witness: _______________________   Date: 6/3/2020

## 2020-06-03 NOTE — Clinical Note
Bilateral groin clipped, prepped with ChloraPrep and draped. Wet prep solution applied at: 1004. Wet prep solution dried at: 1007. Wet prep elapsed drying time: 3 mins.

## 2020-06-03 NOTE — Clinical Note
TRANSFER - IN REPORT:     Verbal report received from: Kayla Mora. Report consisted of patient's Situation, Background, Assessment and   Recommendations(SBAR). Opportunity for questions and clarification was provided. Assessment completed upon patient's arrival to unit and care assumed. Patient transported with a Cardiac Cath Tech / Patient Care Tech.

## 2020-06-03 NOTE — PROGRESS NOTES
TRANSFER - OUT REPORT:    Verbal report given to Deandra on Malathi Rom  being transferred to Vascular lab for ordered procedure       Report consisted of patients Situation, Background, Assessment and   Recommendations(SBAR). Information from the following report(s) SBAR, Procedure Summary and MAR was reviewed with the receiving nurse. Lines:   Peripheral IV 06/03/20 Anterior;Distal;Left Forearm (Active)        Opportunity for questions and clarification was provided.       Patient transported with:   Pixelle

## 2020-06-03 NOTE — PROGRESS NOTES
Patient advised that she would like her family to be updated if possible and gives permission for information to be given to these individuals and her , Samson Cherry (Gene) [phone].     Tanya Mathews (son) - [phone] (cell)    Mark Brown (daughter-in-law) [phone] (work)  [phone] (cell)

## 2020-06-12 NOTE — PROGRESS NOTES
Subjective:      Kenyatta Harkins is a 69 y.o. female who presents today for post op visit, status post mesenteric angiogram    Findings/recommendations: Aorta is normal in size without aneurysm. The origin and for several centimeters the celiac artery are completely patent. The superior mesenteric artery origin is completely patent. It is catheterized with a flush catheter and is completely patent throughout. No indication of any stenosis on the mesenteric vessels. Likely turbulence on Doppler reflects the acute angle as a superior mesenteric artery comes off at a 90 degree angle  No further vascular mention regarding the mesenteric necessary. This was reviewed though she was already aware  She stated Dr Adam Ellis had mentioned general surgery referral for consideration of lysis of adhesions. We provided her with several surgeons to reach out to for an evaluation/opinion    Objective:     Visit Vitals  /62 (BP 1 Location: Left arm, BP Patient Position: Sitting)   Pulse 75   Resp 17   Ht 5' 2\" (1.575 m)   Wt 193 lb (87.5 kg)   SpO2 99%   BMI 35.30 kg/m²         Assessment:     S/p angio      Plan:       ICD-10-CM ICD-9-CM    1. Superior mesenteric artery stenosis (HCC) I77.1 557.1      No orders of the defined types were placed in this encounter. Follow-up and Dispositions    · Return if symptoms worsen or fail to improve. JOSE Feliciano    Portions of this note have been entered using voice recognition software.

## 2020-06-16 ENCOUNTER — OFFICE VISIT (OUTPATIENT)
Dept: VASCULAR SURGERY | Age: 70
End: 2020-06-16

## 2020-06-16 VITALS
DIASTOLIC BLOOD PRESSURE: 62 MMHG | SYSTOLIC BLOOD PRESSURE: 130 MMHG | HEART RATE: 75 BPM | RESPIRATION RATE: 17 BRPM | WEIGHT: 193 LBS | HEIGHT: 62 IN | OXYGEN SATURATION: 99 % | BODY MASS INDEX: 35.51 KG/M2

## 2020-06-16 DIAGNOSIS — K55.1 SUPERIOR MESENTERIC ARTERY STENOSIS (HCC): Primary | ICD-10-CM

## (undated) DEVICE — RADIFOCUS GLIDEWIRE: Brand: GLIDEWIRE

## (undated) DEVICE — ANGIOGRAPHY KIT CUST VASC

## (undated) DEVICE — INTRODUCER SHTH 4FR CANN L11CM DIL TIP 25MM RED TUNGSTEN

## (undated) DEVICE — SET FLD ADMIN 3 W STPCOCK FIX FEM L BOR 1IN

## (undated) DEVICE — RADIFOCUS TORQUE DEVICE MULTI-TORQUE VISE: Brand: RADIFOCUS TORQUE DEVICE

## (undated) DEVICE — PACK PROCEDURE SURG VASC CATH 161 MMC LF

## (undated) DEVICE — COVER US PRB W15XL120CM W/ GEL RUBBERBAND TAPE STRP FLD GEN

## (undated) DEVICE — CATHETER ANGIO AD PED 4FR L65CM GWIRE 0.035IN PERIPH